# Patient Record
Sex: FEMALE | Race: WHITE | NOT HISPANIC OR LATINO | Employment: UNEMPLOYED | URBAN - METROPOLITAN AREA
[De-identification: names, ages, dates, MRNs, and addresses within clinical notes are randomized per-mention and may not be internally consistent; named-entity substitution may affect disease eponyms.]

---

## 2020-05-30 ENCOUNTER — APPOINTMENT (OUTPATIENT)
Dept: GENERAL RADIOLOGY | Facility: HOSPITAL | Age: 40
End: 2020-05-30

## 2020-05-30 ENCOUNTER — HOSPITAL ENCOUNTER (EMERGENCY)
Facility: HOSPITAL | Age: 40
Discharge: HOME | End: 2020-05-30
Attending: EMERGENCY MEDICINE | Admitting: EMERGENCY MEDICINE

## 2020-05-30 VITALS
TEMPERATURE: 97.5 F | HEIGHT: 69 IN | OXYGEN SATURATION: 96 % | BODY MASS INDEX: 39.02 KG/M2 | WEIGHT: 263.45 LBS | SYSTOLIC BLOOD PRESSURE: 139 MMHG | DIASTOLIC BLOOD PRESSURE: 82 MMHG | HEART RATE: 70 BPM | RESPIRATION RATE: 16 BRPM

## 2020-05-30 DIAGNOSIS — S91.209A AVULSION OF TOENAIL, INITIAL ENCOUNTER: Primary | ICD-10-CM

## 2020-05-30 PROCEDURE — 99282 EMERGENCY DEPT VISIT SF MDM: CPT

## 2020-05-30 PROCEDURE — 73660 X-RAY EXAM OF TOE(S): CPT

## 2020-06-02 ENCOUNTER — OFFICE VISIT (OUTPATIENT)
Dept: PODIATRY | Facility: CLINIC | Age: 40
End: 2020-06-02

## 2020-06-02 VITALS
HEART RATE: 130 BPM | DIASTOLIC BLOOD PRESSURE: 91 MMHG | SYSTOLIC BLOOD PRESSURE: 125 MMHG | HEIGHT: 69 IN | WEIGHT: 260 LBS | BODY MASS INDEX: 38.51 KG/M2 | TEMPERATURE: 98 F

## 2020-06-02 DIAGNOSIS — L60.1 ONYCHOLYSIS OF TOENAIL: Primary | ICD-10-CM

## 2020-06-02 PROCEDURE — 99203 OFFICE O/P NEW LOW 30 MIN: CPT | Performed by: PODIATRIST

## 2020-06-03 NOTE — PROGRESS NOTES
2020  Foot and Ankle Surgery - New Patient   Provider: Dr. Jeferson Butt DPM  Location: [facility]    Subjective:  Annette Tiwari is a 39 y.o. female.     Chief Complaint   Patient presents with   • Right Foot - Pain       HPI: Patient is a pleasant 39-year-old female that presents after recent injury to the right great toenail.  She states that her daughter accidentally kicked her toe avulsing the nail plate.  She did report to the ER where the nail was fully removed.  She was dressed and referred to me for further management.  Today, she denies any significant pain.  She feels that the swelling and redness has improved.  She has kept the dressing dry and has not had any prior issues involving her toenails.  She rates her pain a 1 out of 10 today.  No other issues with her feet.    No Known Allergies    Past Medical History:   Diagnosis Date   • Diabetes mellitus (CMS/[facility], located within [facility])    • Hypertension        Past Surgical History:   Procedure Laterality Date   •  SECTION     • CHOLECYSTECTOMY         History reviewed. No pertinent family history.    Social History     Socioeconomic History   • Marital status: Single     Spouse name: Not on file   • Number of children: Not on file   • Years of education: Not on file   • Highest education level: Not on file   Tobacco Use   • Smoking status: Current Every Day Smoker     Packs/day: 1.00     Types: Cigarettes   • Smokeless tobacco: Never Used   Substance and Sexual Activity   • Alcohol use: Never     Frequency: Never   • Drug use: Yes     Types: Marijuana   • Sexual activity: Defer        No current outpatient medications on file prior to visit.     No current facility-administered medications on file prior to visit.        Review of Systems:  General: Denies fever, chills, fatigue, and weakness.  Eyes: Denies vision loss, blurry vision, and excessive redness.  ENT: Denies hearing issues and difficulty swallowing.  Cardiovascular: Denies palpitations,  "chest pain, or syncopal episodes.  Respiratory: Denies shortness of breath, wheezing, and coughing.  GI: Denies abdominal pain, nausea, and vomiting.   : Denies frequency, hematuria, and urgency.  Musculoskeletal: Denies muscle cramps, joint pains, and stiffness.  Derm: + Toenail avulsion  Neuro: Denies headaches, numbness, loss of coordination, and tremors.  Psych: Denies anxiety and depression.  Endocrine: Denies temperature intolerance and changes in appetite.  Heme: Denies bleeding disorders or abnormal bruising.     Objective   /91   Pulse (!) 130   Temp 98 °F (36.7 °C) (Oral)   Ht 175.3 cm (69\")   Wt 118 kg (260 lb)   BMI 38.40 kg/m²     Foot/Ankle Exam:       General:   Appearance: appears stated age and healthy    Orientation: AAOx3    Affect: appropriate    Gait: unimpaired      VASCULAR      Right Foot Vascularity   Normal vascular exam    Dorsalis pedis:  2+  Posterior tibial:  2+  Skin Temperature: warm    Edema Grading:  None  CFT:  < 3 seconds  Pedal Hair Growth:  Present  Varicosities: none        NEUROLOGIC     Right Foot Neurologic   Light touch sensation:  Normal  Hot/Cold sensation: normal    Achilles reflex:  2+     MUSCULOSKELETAL      Right Foot Musculoskeletal   Ecchymosis:  None  Tenderness: toe 1    Arch:  Normal     MUSCLE STRENGTH     Right Foot Muscle Strength   Normal strength    Foot dorsiflexion:  5  Foot plantar flexion:  5  Foot inversion:  5  Foot eversion:  5     DERMATOLOGIC     Right Foot Dermatologic   Skin: right foot skin not intact    Skin comment:  Right hallux nail bed is exposed but granular and shows no signs of infection.      Right Foot Additional Comments Exposed nail bed to the right great toe otherwise unremarkable.  No gross deformity or instability.      Assessment/Plan   Annette was seen today for pain.    Diagnoses and all orders for this visit:    Onycholysis of toenail      Patient presents after recent injury to her right great toe.  The toenail has " been fully removed.  The wound bed is clean and granular today.  No signs of infection are present.  I have instructed her on proper care with dressings and soaks.  I do anticipate the nailbed will continue to heal and a new nail will grow.  She has been instructed to call with any additional issues or concerns.  I will see her on an as-needed basis.    No orders of the defined types were placed in this encounter.       Note is dictated utilizing voice recognition software. Unfortunately this leads to occasional typographical errors. I apologize in advance if the situation occurs. If questions occur please do not hesitate to call our office.

## 2022-01-31 ENCOUNTER — TELEPHONE (OUTPATIENT)
Dept: ENDOCRINOLOGY | Facility: CLINIC | Age: 42
End: 2022-01-31

## 2023-08-17 ENCOUNTER — TELEPHONE (OUTPATIENT)
Dept: ONCOLOGY | Facility: CLINIC | Age: 43
End: 2023-08-17
Payer: MEDICAID

## 2023-08-22 ENCOUNTER — TELEPHONE (OUTPATIENT)
Dept: ONCOLOGY | Facility: CLINIC | Age: 43
End: 2023-08-22
Payer: MEDICAID

## 2023-08-25 ENCOUNTER — TELEPHONE (OUTPATIENT)
Dept: ONCOLOGY | Facility: CLINIC | Age: 43
End: 2023-08-25
Payer: MEDICAID

## 2023-08-25 NOTE — TELEPHONE ENCOUNTER
Hub is instructed to read the documentation below to patient   ..Left v/m asking Pt to call so we may jossue appt from New Pt Ref w/ Dr Webber

## 2023-08-30 ENCOUNTER — TELEPHONE (OUTPATIENT)
Dept: ONCOLOGY | Facility: CLINIC | Age: 43
End: 2023-08-30
Payer: MEDICAID

## 2023-09-22 NOTE — PROGRESS NOTES
Hematology/Oncology Outpatient Consultation    Patient name: Annette Tiwari  : 1980  MRN: [id number]  Primary Care Physician: Provider, No Known  Referring Physician: Sharlene Canela A*  Reason For Consult:     Chief Complaint   Patient presents with    Appointment     Polycythemia       History of Present Illness:    42-year-old female has referred secondary to polycythemia.  Patient had a CBC completed on 10/17/2022 white count was 9, hemoglobin 17.3, MCV was 86 platelets were 281 differential is a 59% neutrophils 27% lymphocytes there was no evidence of leukoerythroblastic changes.    Today her white count is 9.32, hemoglobin 16.8, MCV was 85 platelets 248 with essentially unremarkable differentials.    She complains of longtime fatigue but denies any significant weight loss.  She denies any bleeding issues.    Patient has no prior history of any hematologic problems.  Patient is diabetic and currently on hypoglycemic agents.  Her A1c last checked was 9.    There is family history of multiple malignancies including 3 maternal aunts with breast cancer, one paternal aunt with breast cancer in her 50s.  Her sister had melanoma.  Brother had basal cell carcinoma of the skin, her father had colon cancer and her mother had basal cell carcinoma of the skin.    Patient has no personal history of malignancy herself.    Patient is single and has 1 daughter who is disabled.    She smokes half pack of cigarettes for more than 20 years.  She drinks alcohol occasionally.    Past Medical History:   Diagnosis Date    Diabetes mellitus     Hypertension        Past Surgical History:   Procedure Laterality Date     SECTION      CHOLECYSTECTOMY           Current Outpatient Medications:     atorvastatin (LIPITOR) 20 MG tablet, Take 1 tablet by mouth Daily., Disp: , Rfl:     busPIRone (BUSPAR) 15 MG tablet, Take 1 tablet by mouth Daily., Disp: , Rfl:     busPIRone (BUSPAR) 5 MG tablet, Take 1 tablet by mouth 3  (Three) Times a Day As Needed., Disp: , Rfl:     Cholecalciferol (Vitamin D3) 1.25 MG (96757 UT) capsule, Take 1 capsule by mouth 1 (One) Time Per Week., Disp: , Rfl:     Continuous Blood Gluc  (Dexcom G6 ) device, Dexcom G6 , See Instructions, Testing blood sugar up to 3x daily, # 1 EA, 0 Refill(s), Pharmacy: [facility]/pharmacy #[id number], Testing blood sugar up to 3x daily, Supply, 176, cm, 10/25/22 9:17:00 EDT, Height/Length Dosing, 109.9, kg, 10/25/22 9:17:00 E..., Disp: , Rfl:     escitalopram (LEXAPRO) 10 MG tablet, Take  by mouth., Disp: , Rfl:     fenofibrate (TRICOR) 145 MG tablet, Take 1 tablet by mouth Daily., Disp: , Rfl:     glimepiride (AMARYL) 2 MG tablet, Take 1 tablet by mouth Daily., Disp: , Rfl:     Jardiance 10 MG tablet tablet, Take 1 tablet by mouth Every Morning., Disp: , Rfl:     metoprolol succinate XL (TOPROL-XL) 50 MG 24 hr tablet, Take 1 tablet by mouth Daily., Disp: , Rfl:     No Known Allergies    Immunization History   Administered Date(s) Administered    Flu Vaccine Quad PF 6-35MO 09/14/2019    Fluzone (or Fluarix & Flulaval for VFC) >6mos 09/14/2019    Influenza, Unspecified 11/22/2022    TD Preservative Free (Tenivac) 11/15/2014    Tdap 05/19/2013       No family history on file.    Cancer-related family history is not on file.    Social History     Tobacco Use    Smoking status: Every Day     Packs/day: 1.00     Types: Cigarettes    Smokeless tobacco: Never   Substance Use Topics    Alcohol use: Never    Drug use: Yes     Types: Marijuana       ROS:    Review of Systems   Constitutional:  Negative for chills, fatigue and fever.   HENT:  Negative for congestion, drooling, ear discharge, rhinorrhea, sinus pressure and tinnitus.    Eyes:  Negative for photophobia, pain and discharge.   Respiratory:  Negative for apnea, choking and stridor.    Cardiovascular:  Negative for palpitations.   Gastrointestinal:  Negative for abdominal distention, abdominal pain and anal  "bleeding.   Endocrine: Negative for polydipsia and polyphagia.   Genitourinary:  Negative for decreased urine volume, flank pain and genital sores.   Musculoskeletal:  Negative for gait problem, neck pain and neck stiffness.   Skin:  Negative for color change, rash and wound.   Neurological:  Negative for tremors, seizures, syncope, facial asymmetry and speech difficulty.   Hematological:  Negative for adenopathy.   Psychiatric/Behavioral:  Negative for agitation, confusion, hallucinations and self-injury. The patient is not hyperactive.      Objective:    Vitals:    09/26/23 1015   BP: 107/76   Pulse: 80   Resp: 18   Temp: 98 °F (36.7 °C)   TempSrc: Infrared   SpO2: 97%   Weight: 112 kg (248 lb)   Height: 175.3 cm (69\")   PainSc: 0-No pain     Body mass index is 36.62 kg/m².    ECOG  (0) Fully active, able to carry on all predisease performance without restriction    Physical Exam:  Physical Exam  Vitals and nursing note reviewed.   Constitutional:       General: She is not in acute distress.     Appearance: She is not diaphoretic.   HENT:      Head: Normocephalic and atraumatic.   Eyes:      General: No scleral icterus.        Right eye: No discharge.         Left eye: No discharge.      Conjunctiva/sclera: Conjunctivae normal.   Neck:      Thyroid: No thyromegaly.   Cardiovascular:      Rate and Rhythm: Normal rate and regular rhythm.      Heart sounds: Normal heart sounds.     No friction rub. No gallop.   Pulmonary:      Effort: Pulmonary effort is normal. No respiratory distress.      Breath sounds: No stridor. No wheezing.   Abdominal:      General: Bowel sounds are normal.      Palpations: Abdomen is soft. There is no mass.      Tenderness: There is no abdominal tenderness. There is no guarding or rebound.   Musculoskeletal:         General: No tenderness. Normal range of motion.      Cervical back: Normal range of motion and neck supple.   Lymphadenopathy:      Cervical: No cervical adenopathy.   Skin:     " General: Skin is warm.      Findings: No erythema or rash.   Neurological:      Mental Status: She is alert and oriented to person, place, and time.      Motor: No abnormal muscle tone.   Psychiatric:         Behavior: Behavior normal.       RECENT LABS  WBC   Date Value Ref Range Status   09/26/2023 9.32 3.40 - 10.80 10*3/mm3 Final     RBC   Date Value Ref Range Status   09/26/2023 5.73 (H) 3.77 - 5.28 10*6/mm3 Final     Hemoglobin   Date Value Ref Range Status   09/26/2023 16.8 (H) 12.0 - 15.9 g/dL Final     Hematocrit   Date Value Ref Range Status   09/26/2023 48.8 (H) 34.0 - 46.6 % Final     MCV   Date Value Ref Range Status   09/26/2023 85.2 79.0 - 97.0 fL Final     MCH   Date Value Ref Range Status   09/26/2023 29.3 26.6 - 33.0 pg Final     MCHC   Date Value Ref Range Status   09/26/2023 34.4 31.5 - 35.7 g/dL Final     RDW   Date Value Ref Range Status   09/26/2023 12.6 12.3 - 15.4 % Final     RDW-SD   Date Value Ref Range Status   09/26/2023 38.3 37.0 - 54.0 fl Final     MPV   Date Value Ref Range Status   09/26/2023 10.1 6.0 - 12.0 fL Final     Platelets   Date Value Ref Range Status   09/26/2023 248 140 - 450 10*3/mm3 Final     Neutrophil %   Date Value Ref Range Status   09/26/2023 55.5 42.7 - 76.0 % Final     Lymphocyte %   Date Value Ref Range Status   09/26/2023 29.5 19.6 - 45.3 % Final     Monocyte %   Date Value Ref Range Status   09/26/2023 7.6 5.0 - 12.0 % Final     Eosinophil %   Date Value Ref Range Status   09/26/2023 5.9 0.3 - 6.2 % Final     Basophil %   Date Value Ref Range Status   09/26/2023 1.5 0.0 - 1.5 % Final     Neutrophils, Absolute   Date Value Ref Range Status   09/26/2023 5.17 1.70 - 7.00 10*3/mm3 Final     Lymphocytes, Absolute   Date Value Ref Range Status   09/26/2023 2.75 0.70 - 3.10 10*3/mm3 Final     Monocytes, Absolute   Date Value Ref Range Status   09/26/2023 0.71 0.10 - 0.90 10*3/mm3 Final     Eosinophils, Absolute   Date Value Ref Range Status   09/26/2023 0.55 (H) 0.00 -  0.40 10*3/mm3 Final     Basophils, Absolute   Date Value Ref Range Status   09/26/2023 0.14 0.00 - 0.20 10*3/mm3 Final       No results found for: GLUCOSE, BUN, CREATININE, EGFRIFNONA, EGFRIFAFRI, BCR, K, CO2, CALCIUM, PROTENTOTREF, ALBUMIN, LABIL2, BILIRUBIN, AST, ALT      Assessment & Plan   Polycythemia  - CBC & Differential      Polycythemia, rule out myeloproliferative disease  Evaluate for possible obstructive sleep apnea: Referred to Dr. Zapata  Tobacco use disorder which could be contributing to her polycythemia  Poorly controlled diabetes type 2 may cause volume contraction, secondary polycythemia as well          Plans:    Polycythemia work-up including a peripheral smear  Referred to pulmonary for sleep study  Follow-up 3 weeks review results make further recommendations  Patient counseled on increase p.o. fluids  Patient counseled on tobacco cessation      I counselled Annette of the risks of continuing to use tobacco and cessation.    During this visit, I spent 3-10 minutes counseling the patient regarding tobacco cessation.     Patient verbalized understanding and is in agreement of the above plan.            I spent 45 total minutes, face-to-face, caring for Annette today. 90% of this time involved counseling and/or coordination of care as documented within this note.

## 2023-09-26 ENCOUNTER — LAB (OUTPATIENT)
Dept: LAB | Facility: HOSPITAL | Age: 43
End: 2023-09-26
Payer: MEDICAID

## 2023-09-26 ENCOUNTER — CONSULT (OUTPATIENT)
Dept: ONCOLOGY | Facility: CLINIC | Age: 43
End: 2023-09-26
Payer: MEDICAID

## 2023-09-26 VITALS
SYSTOLIC BLOOD PRESSURE: 107 MMHG | RESPIRATION RATE: 18 BRPM | DIASTOLIC BLOOD PRESSURE: 76 MMHG | OXYGEN SATURATION: 97 % | HEART RATE: 80 BPM | TEMPERATURE: 98 F | HEIGHT: 69 IN | WEIGHT: 248 LBS | BODY MASS INDEX: 36.73 KG/M2

## 2023-09-26 DIAGNOSIS — D75.1 POLYCYTHEMIA: Primary | ICD-10-CM

## 2023-09-26 DIAGNOSIS — D75.1 POLYCYTHEMIA: ICD-10-CM

## 2023-09-26 PROBLEM — E78.5 HYPERLIPIDEMIA: Status: ACTIVE | Noted: 2023-09-26

## 2023-09-26 PROBLEM — F98.8 ATTENTION DEFICIT DISORDER (ADD) WITHOUT HYPERACTIVITY: Status: ACTIVE | Noted: 2023-09-26

## 2023-09-26 PROBLEM — K21.9 GASTROESOPHAGEAL REFLUX DISEASE: Status: ACTIVE | Noted: 2023-09-26

## 2023-09-26 PROBLEM — E11.9 TYPE II DIABETES MELLITUS: Status: ACTIVE | Noted: 2023-09-26

## 2023-09-26 PROBLEM — I10 HTN (HYPERTENSION): Status: ACTIVE | Noted: 2023-09-26

## 2023-09-26 PROBLEM — E28.2 PCOS (POLYCYSTIC OVARIAN SYNDROME): Status: ACTIVE | Noted: 2023-09-26

## 2023-09-26 PROBLEM — F32.A DEPRESSION: Status: ACTIVE | Noted: 2023-09-26

## 2023-09-26 PROBLEM — F41.9 ANXIETY: Status: ACTIVE | Noted: 2023-09-26

## 2023-09-26 PROBLEM — H53.10 SUBJECTIVE VISUAL DISTURBANCE: Status: ACTIVE | Noted: 2019-02-28

## 2023-09-26 LAB
BASOPHILS # BLD AUTO: 0.14 10*3/MM3 (ref 0–0.2)
BASOPHILS NFR BLD AUTO: 1.5 % (ref 0–1.5)
DEPRECATED RDW RBC AUTO: 38.3 FL (ref 37–54)
EOSINOPHIL # BLD AUTO: 0.55 10*3/MM3 (ref 0–0.4)
EOSINOPHIL NFR BLD AUTO: 5.9 % (ref 0.3–6.2)
ERYTHROCYTE [DISTWIDTH] IN BLOOD BY AUTOMATED COUNT: 12.6 % (ref 12.3–15.4)
HCT VFR BLD AUTO: 48.8 % (ref 34–46.6)
HGB BLD-MCNC: 16.8 G/DL (ref 12–15.9)
LDH SERPL-CCNC: 188 U/L (ref 135–214)
LYMPHOCYTES # BLD AUTO: 2.75 10*3/MM3 (ref 0.7–3.1)
LYMPHOCYTES NFR BLD AUTO: 29.5 % (ref 19.6–45.3)
MCH RBC QN AUTO: 29.3 PG (ref 26.6–33)
MCHC RBC AUTO-ENTMCNC: 34.4 G/DL (ref 31.5–35.7)
MCV RBC AUTO: 85.2 FL (ref 79–97)
MONOCYTES # BLD AUTO: 0.71 10*3/MM3 (ref 0.1–0.9)
MONOCYTES NFR BLD AUTO: 7.6 % (ref 5–12)
NEUTROPHILS NFR BLD AUTO: 5.17 10*3/MM3 (ref 1.7–7)
NEUTROPHILS NFR BLD AUTO: 55.5 % (ref 42.7–76)
PATHOLOGY REVIEW: YES
PLATELET # BLD AUTO: 248 10*3/MM3 (ref 140–450)
PMV BLD AUTO: 10.1 FL (ref 6–12)
RBC # BLD AUTO: 5.73 10*6/MM3 (ref 3.77–5.28)
URATE SERPL-MCNC: 2.7 MG/DL (ref 2.4–5.7)
VIT B12 BLD-MCNC: 816 PG/ML (ref 211–946)
WBC NRBC COR # BLD: 9.32 10*3/MM3 (ref 3.4–10.8)

## 2023-09-26 PROCEDURE — 82607 VITAMIN B-12: CPT | Performed by: INTERNAL MEDICINE

## 2023-09-26 PROCEDURE — 84550 ASSAY OF BLOOD/URIC ACID: CPT | Performed by: INTERNAL MEDICINE

## 2023-09-26 PROCEDURE — 85025 COMPLETE CBC W/AUTO DIFF WBC: CPT

## 2023-09-26 PROCEDURE — 83615 LACTATE (LD) (LDH) ENZYME: CPT | Performed by: INTERNAL MEDICINE

## 2023-09-26 PROCEDURE — 36415 COLL VENOUS BLD VENIPUNCTURE: CPT

## 2023-09-26 RX ORDER — METOPROLOL SUCCINATE 50 MG/1
1 TABLET, EXTENDED RELEASE ORAL DAILY
COMMUNITY
Start: 2023-08-15

## 2023-09-26 RX ORDER — PROCHLORPERAZINE 25 MG/1
SUPPOSITORY RECTAL
COMMUNITY
Start: 2023-08-11

## 2023-09-26 RX ORDER — FENOFIBRATE 145 MG/1
145 TABLET, COATED ORAL DAILY
COMMUNITY

## 2023-09-26 RX ORDER — ATOMOXETINE 25 MG/1
25 CAPSULE ORAL EVERY MORNING
COMMUNITY
End: 2023-09-26

## 2023-09-26 RX ORDER — CHOLECALCIFEROL (VITAMIN D3) 1250 MCG
1 CAPSULE ORAL WEEKLY
COMMUNITY
Start: 2023-09-08

## 2023-09-26 RX ORDER — EMPAGLIFLOZIN 10 MG/1
10 TABLET, FILM COATED ORAL EVERY MORNING
COMMUNITY

## 2023-09-26 RX ORDER — BUSPIRONE HYDROCHLORIDE 5 MG/1
5 TABLET ORAL 3 TIMES DAILY PRN
COMMUNITY

## 2023-09-26 RX ORDER — GLIMEPIRIDE 2 MG/1
2 TABLET ORAL DAILY
COMMUNITY

## 2023-09-26 RX ORDER — ESCITALOPRAM OXALATE 10 MG/1
TABLET ORAL
COMMUNITY
Start: 2023-05-21

## 2023-09-26 RX ORDER — ATORVASTATIN CALCIUM 20 MG/1
20 TABLET, FILM COATED ORAL DAILY
COMMUNITY

## 2023-09-26 RX ORDER — BUSPIRONE HYDROCHLORIDE 15 MG/1
1 TABLET ORAL DAILY
COMMUNITY
Start: 2023-09-15

## 2023-09-26 NOTE — LETTER
2023     VIKI العراقي  [address]  [address]  [place] [address]    Patient: Annette Tiwari   YOB: 1980   Date of Visit: 2023     Dear VIKI العراقي:       Thank you for referring Annette Tiwari to me for evaluation. Below are the relevant portions of my assessment and plan of care.    If you have questions, please do not hesitate to call me. I look forward to following Annette along with you.         Sincerely,        Caty Webber MD        CC: No Recipients    Caty Webber MD  23 [id number]  Sign when Signing Visit   Hematology/Oncology Outpatient Consultation    Patient name: Annette Tiwari  : 1980  MRN: [id number]  Primary Care Physician: Provider, No Known  Referring Physician: Sharlene Canela A*  Reason For Consult:     Chief Complaint   Patient presents with   • Appointment     Polycythemia       History of Present Illness:    42-year-old female has referred secondary to polycythemia.  Patient had a CBC completed on 10/17/2022 white count was 9, hemoglobin 17.3, MCV was 86 platelets were 281 differential is a 59% neutrophils 27% lymphocytes there was no evidence of leukoerythroblastic changes.    Today her white count is 9.32, hemoglobin 16.8, MCV was 85 platelets 248 with essentially unremarkable differentials.    She complains of longtime fatigue but denies any significant weight loss.  She denies any bleeding issues.    Patient has no prior history of any hematologic problems.  Patient is diabetic and currently on hypoglycemic agents.  Her A1c last checked was 9.    There is family history of multiple malignancies including 3 maternal aunts with breast cancer, one paternal aunt with breast cancer in her 50s.  Her sister had melanoma.  Brother had basal cell carcinoma of the skin, her father had colon cancer and her mother had basal cell carcinoma of the skin.    Patient has no personal history of malignancy  herself.    Patient is single and has 1 daughter who is disabled.    She smokes half pack of cigarettes for more than 20 years.  She drinks alcohol occasionally.    Past Medical History:   Diagnosis Date   • Diabetes mellitus    • Hypertension        Past Surgical History:   Procedure Laterality Date   •  SECTION     • CHOLECYSTECTOMY           Current Outpatient Medications:   •  atorvastatin (LIPITOR) 20 MG tablet, Take 1 tablet by mouth Daily., Disp: , Rfl:   •  busPIRone (BUSPAR) 15 MG tablet, Take 1 tablet by mouth Daily., Disp: , Rfl:   •  busPIRone (BUSPAR) 5 MG tablet, Take 1 tablet by mouth 3 (Three) Times a Day As Needed., Disp: , Rfl:   •  Cholecalciferol (Vitamin D3) 1.25 MG (56596 UT) capsule, Take 1 capsule by mouth 1 (One) Time Per Week., Disp: , Rfl:   •  Continuous Blood Gluc  (Dexcom G6 ) device, Dexcom G6 , See Instructions, Testing blood sugar up to 3x daily, # 1 EA, 0 Refill(s), Pharmacy: [facility]/pharmacy #[id number], Testing blood sugar up to 3x daily, Supply, 176, cm, 10/25/22 9:17:00 EDT, Height/Length Dosing, 109.9, kg, 10/25/22 9:17:00 E..., Disp: , Rfl:   •  escitalopram (LEXAPRO) 10 MG tablet, Take  by mouth., Disp: , Rfl:   •  fenofibrate (TRICOR) 145 MG tablet, Take 1 tablet by mouth Daily., Disp: , Rfl:   •  glimepiride (AMARYL) 2 MG tablet, Take 1 tablet by mouth Daily., Disp: , Rfl:   •  Jardiance 10 MG tablet tablet, Take 1 tablet by mouth Every Morning., Disp: , Rfl:   •  metoprolol succinate XL (TOPROL-XL) 50 MG 24 hr tablet, Take 1 tablet by mouth Daily., Disp: , Rfl:     No Known Allergies    Immunization History   Administered Date(s) Administered   • Flu Vaccine Quad PF 6-35MO 2019   • Fluzone (or Fluarix & Flulaval for VFC) >6mos 2019   • Influenza, Unspecified 2022   • TD Preservative Free (Tenivac) 11/15/2014   • Tdap 2013       No family history on file.    Cancer-related family history is not on file.    Social History  "    Tobacco Use   • Smoking status: Every Day     Packs/day: 1.00     Types: Cigarettes   • Smokeless tobacco: Never   Substance Use Topics   • Alcohol use: Never   • Drug use: Yes     Types: Marijuana       ROS:    Review of Systems   Constitutional:  Negative for chills, fatigue and fever.   HENT:  Negative for congestion, drooling, ear discharge, rhinorrhea, sinus pressure and tinnitus.    Eyes:  Negative for photophobia, pain and discharge.   Respiratory:  Negative for apnea, choking and stridor.    Cardiovascular:  Negative for palpitations.   Gastrointestinal:  Negative for abdominal distention, abdominal pain and anal bleeding.   Endocrine: Negative for polydipsia and polyphagia.   Genitourinary:  Negative for decreased urine volume, flank pain and genital sores.   Musculoskeletal:  Negative for gait problem, neck pain and neck stiffness.   Skin:  Negative for color change, rash and wound.   Neurological:  Negative for tremors, seizures, syncope, facial asymmetry and speech difficulty.   Hematological:  Negative for adenopathy.   Psychiatric/Behavioral:  Negative for agitation, confusion, hallucinations and self-injury. The patient is not hyperactive.      Objective:    Vitals:    09/26/23 1015   BP: 107/76   Pulse: 80   Resp: 18   Temp: 98 °F (36.7 °C)   TempSrc: Infrared   SpO2: 97%   Weight: 112 kg (248 lb)   Height: 175.3 cm (69\")   PainSc: 0-No pain     Body mass index is 36.62 kg/m².    ECOG  (0) Fully active, able to carry on all predisease performance without restriction    Physical Exam:  Physical Exam  Vitals and nursing note reviewed.   Constitutional:       General: She is not in acute distress.     Appearance: She is not diaphoretic.   HENT:      Head: Normocephalic and atraumatic.   Eyes:      General: No scleral icterus.        Right eye: No discharge.         Left eye: No discharge.      Conjunctiva/sclera: Conjunctivae normal.   Neck:      Thyroid: No thyromegaly.   Cardiovascular:      Rate " and Rhythm: Normal rate and regular rhythm.      Heart sounds: Normal heart sounds.     No friction rub. No gallop.   Pulmonary:      Effort: Pulmonary effort is normal. No respiratory distress.      Breath sounds: No stridor. No wheezing.   Abdominal:      General: Bowel sounds are normal.      Palpations: Abdomen is soft. There is no mass.      Tenderness: There is no abdominal tenderness. There is no guarding or rebound.   Musculoskeletal:         General: No tenderness. Normal range of motion.      Cervical back: Normal range of motion and neck supple.   Lymphadenopathy:      Cervical: No cervical adenopathy.   Skin:     General: Skin is warm.      Findings: No erythema or rash.   Neurological:      Mental Status: She is alert and oriented to person, place, and time.      Motor: No abnormal muscle tone.   Psychiatric:         Behavior: Behavior normal.       RECENT LABS  WBC   Date Value Ref Range Status   09/26/2023 9.32 3.40 - 10.80 10*3/mm3 Final     RBC   Date Value Ref Range Status   09/26/2023 5.73 (H) 3.77 - 5.28 10*6/mm3 Final     Hemoglobin   Date Value Ref Range Status   09/26/2023 16.8 (H) 12.0 - 15.9 g/dL Final     Hematocrit   Date Value Ref Range Status   09/26/2023 48.8 (H) 34.0 - 46.6 % Final     MCV   Date Value Ref Range Status   09/26/2023 85.2 79.0 - 97.0 fL Final     MCH   Date Value Ref Range Status   09/26/2023 29.3 26.6 - 33.0 pg Final     MCHC   Date Value Ref Range Status   09/26/2023 34.4 31.5 - 35.7 g/dL Final     RDW   Date Value Ref Range Status   09/26/2023 12.6 12.3 - 15.4 % Final     RDW-SD   Date Value Ref Range Status   09/26/2023 38.3 37.0 - 54.0 fl Final     MPV   Date Value Ref Range Status   09/26/2023 10.1 6.0 - 12.0 fL Final     Platelets   Date Value Ref Range Status   09/26/2023 248 140 - 450 10*3/mm3 Final     Neutrophil %   Date Value Ref Range Status   09/26/2023 55.5 42.7 - 76.0 % Final     Lymphocyte %   Date Value Ref Range Status   09/26/2023 29.5 19.6 - 45.3 %  Final     Monocyte %   Date Value Ref Range Status   09/26/2023 7.6 5.0 - 12.0 % Final     Eosinophil %   Date Value Ref Range Status   09/26/2023 5.9 0.3 - 6.2 % Final     Basophil %   Date Value Ref Range Status   09/26/2023 1.5 0.0 - 1.5 % Final     Neutrophils, Absolute   Date Value Ref Range Status   09/26/2023 5.17 1.70 - 7.00 10*3/mm3 Final     Lymphocytes, Absolute   Date Value Ref Range Status   09/26/2023 2.75 0.70 - 3.10 10*3/mm3 Final     Monocytes, Absolute   Date Value Ref Range Status   09/26/2023 0.71 0.10 - 0.90 10*3/mm3 Final     Eosinophils, Absolute   Date Value Ref Range Status   09/26/2023 0.55 (H) 0.00 - 0.40 10*3/mm3 Final     Basophils, Absolute   Date Value Ref Range Status   09/26/2023 0.14 0.00 - 0.20 10*3/mm3 Final       No results found for: GLUCOSE, BUN, CREATININE, EGFRIFNONA, EGFRIFAFRI, BCR, K, CO2, CALCIUM, PROTENTOTREF, ALBUMIN, LABIL2, BILIRUBIN, AST, ALT      Assessment & Plan   Polycythemia  - CBC & Differential      Polycythemia, rule out myeloproliferative disease  Evaluate for possible obstructive sleep apnea: Referred to Dr. Zapata  Tobacco use disorder which could be contributing to her polycythemia  Poorly controlled diabetes type 2 may cause volume contraction, secondary polycythemia as well          Plans:    Polycythemia work-up including a peripheral smear  Referred to pulmonary for sleep study  Follow-up 3 weeks review results make further recommendations  Patient counseled on increase p.o. fluids  Patient counseled on tobacco cessation      I counselled Annette of the risks of continuing to use tobacco and cessation.    During this visit, I spent 3-10 minutes counseling the patient regarding tobacco cessation.     Patient verbalized understanding and is in agreement of the above plan.            I spent 45 total minutes, face-to-face, caring for Annette today. 90% of this time involved counseling and/or coordination of care as documented within this note.

## 2023-09-27 LAB
EPO SERPL-ACNC: 5.8 MIU/ML (ref 2.6–18.5)
LAB AP CASE REPORT: NORMAL
PATH REPORT.FINAL DX SPEC: NORMAL

## 2023-09-28 ENCOUNTER — PATIENT ROUNDING (BHMG ONLY) (OUTPATIENT)
Dept: ONCOLOGY | Facility: CLINIC | Age: 43
End: 2023-09-28
Payer: MEDICAID

## 2023-09-28 NOTE — PROGRESS NOTES
September 28, 2023    Hello, may I speak with Annette MONTANA Te?    My name is Caity Myles        I am  with [facility]  [address] [place] [id number] [phone].    Before we get started may I verify your date of birth? 1980    I am calling to officially welcome you to our practice and ask about your recent visit. Is this a good time to talk? no    Tell me about your visit with us. What things went well?  A My Chart message was sent to the patient.        We're always looking for ways to make our patients' experiences even better. Do you have recommendations on ways we may improve?  no    Overall were you satisfied with your first visit to our practice? yes       I appreciate you taking the time to speak with me today. Is there anything else I can do for you? no      Thank you, and have a great day.

## 2023-09-29 LAB
CELLS ANALYZED: 200
CELLS COUNTED: 200
CHROM ANALY RESULT (ISCN): NORMAL
CLINICAL CYTOGENETICIST SPEC: NORMAL
DIAGNOSTIC IMP SPEC-IMP: NORMAL
SPECIMEN SOURCE: NORMAL

## 2023-09-30 LAB — METHYLMALONATE SERPL-SCNC: 135 NMOL/L (ref 0–378)

## 2023-10-02 LAB
JAK2 P.V617F BLD/T QL: NORMAL
LAB DIRECTOR NAME PROVIDER: NORMAL
LABORATORY COMMENT REPORT: NORMAL

## 2023-10-10 LAB
REF LAB TEST METHOD: NORMAL